# Patient Record
Sex: MALE | Race: BLACK OR AFRICAN AMERICAN
[De-identification: names, ages, dates, MRNs, and addresses within clinical notes are randomized per-mention and may not be internally consistent; named-entity substitution may affect disease eponyms.]

---

## 2019-01-11 ENCOUNTER — HOSPITAL ENCOUNTER (EMERGENCY)
Dept: HOSPITAL 72 - EMR | Age: 49
Discharge: HOME | End: 2019-01-11
Payer: COMMERCIAL

## 2019-01-11 VITALS — BODY MASS INDEX: 33.13 KG/M2 | WEIGHT: 250 LBS | HEIGHT: 73 IN

## 2019-01-11 VITALS — SYSTOLIC BLOOD PRESSURE: 112 MMHG | DIASTOLIC BLOOD PRESSURE: 70 MMHG

## 2019-01-11 DIAGNOSIS — I10: ICD-10-CM

## 2019-01-11 DIAGNOSIS — G89.29: ICD-10-CM

## 2019-01-11 DIAGNOSIS — Z86.73: ICD-10-CM

## 2019-01-11 DIAGNOSIS — M25.511: Primary | ICD-10-CM

## 2019-01-11 DIAGNOSIS — I25.10: ICD-10-CM

## 2019-01-11 PROCEDURE — 99282 EMERGENCY DEPT VISIT SF MDM: CPT

## 2019-01-11 NOTE — EMERGENCY ROOM REPORT
History of Present Illness


General


Chief Complaint:  Upper Extremity Injury


Source:  Family Member





Present Illness


HPI


48-year-old male patient presents the ER complaining of right shoulder and 

upper back pain.  Reports pain is been present for several years but has 

increased in the past few weeks.  Patient states that he works as a  

and believes the pain is related to that.  States that he was seen by his 

primary care doctor a week ago and had x-rays done, states that he has rotator 

cuff disease and arthritis.  Reports pain is been increasing since that time 

and radiating to the center of his middle upper back.  Reports has been taking 

ibuprofen without relief of symptoms.  Patient states that he was not able to 

get referral to orthopedic specialist to get an MRI done because it was denied 

by insurance.  Patient denies acute injury or trauma.  Requesting information 

about treatment and to relieve pain symptoms.  States has not seen physical 

therapy.  Denies fever, chest pain, shortness of breath.


Allergies:  


Coded Allergies:  


     No Known Allergies (Unverified , 5/23/14)





Patient History


Past Medical History:  see triage record


Reviewed Nursing Documentation:  PMH: Agreed; PSxH: Agreed





Nursing Documentation-PMH


Past Medical History:  No History, Except For


Hx Cardiac Problems:  Yes - CAD  TIA


Hx Hypertension:  Yes


Hx Cerebrovascular Accident:  Yes - TIA





Review of Systems


All Other Systems:  negative except mentioned in HPI





Physical Exam





Vital Signs








  Date Time  Temp Pulse Resp B/P (MAP) Pulse Ox O2 Delivery O2 Flow Rate FiO2


 


1/11/19 12:48 97.9 52 18 112/70 99 Room Air  








Sp02 EP Interpretation:  reviewed, normal


General Appearance:  well appearing, no apparent distress, alert, GCS 15, non-

toxic


Head:  normocephalic, atraumatic


Eyes:  bilateral eye normal inspection, bilateral eye PERRL


ENT:  hearing grossly normal, normal pharynx, no angioedema, normal voice, 

uvula midline, moist mucus membranes


Neck:  full range of motion


Respiratory:  lungs clear, normal breath sounds, no rhonchi, no respiratory 

distress, no accessory muscle use, no wheezing, speaking full sentences


Cardiovascular #1:  regular rate, rhythm, no edema


Cardiovascular #2:  2+ radial (R), 2+ radial (L)


Musculoskeletal:  back normal, digits/nails normal, gait/station normal, normal 

range of motion, non-tender, other - NVI, cap refill <2seconds, AIN/PIN/radial 

nerve, no wrist drop, no deformity, negative sulcus sign, no skin tenting


Neurologic:  alert, oriented x3, responsive, motor strength/tone normal, 

sensory intact


Psychiatric:  mood/affect normal


Skin:  no rash





Medical Decision Making


PA Attestation


Dr. Rosen is my supervising Physician whom patient management has been 

discussed with.


Diagnostic Impression:  


 Primary Impression:  


 Chronic pain in right shoulder


ER Course


Pt. presents to the ED c/o right shoulder and upper back pain.





Ddx considered but are not limited to fracture, sprain, strain, contusion, 

dislocation.


No erythema, no warmth to touch, no fever, nontoxic appearing, low suspicion 

for septic joint.


Soft compartments, no pulselessness, no pallor, no paresthesias, low suspicion 

for compartment syndrome at this time.





Vital signs: are WNL, pt. is afebrile





Ordered X-ray and pain medication.





ER COURSE


Provided with pain medication and lidocaine patch in the ER.


Advised patient not to take more than recommended dose of ibuprofen, may 

alternate taking Tylenol and ibuprofen every 4 hours.


Denies acute injury or trauma, full range of motion, does not require x-ray at 

this time.  Patient has pain with range of motion however has active range of 

motion.  Pain likely secondary to arthritis and perhaps muscular in nature.  

Will provide patient with Robaxin and lidocaine patch at discharge.


Advised on stretching exercises.





Patient instructed on RICE method: rest, ice, compression, elevation.


Patient instructed on rest, ice and heat.


Patient instructed to be WBAT





Work note provided.


Contact information for orthopedic urgent care provided, follow-up with urgent 

care if unable to followup with primary care provider and get referral to 

orthopedic specialist.


Followup with primary care provider. Discuss referral to ortho/pain management/

PT as needed. Discuss further imaging with MRI/CT as needed.





DISCHARGE:


-Rx provided for lidocaine patch


-Rx provided for Methocarbamol. SE drowsiness, do not drink, drive, or operate 

heavy machinery while using.





At this time pt. is stable for d/c to home. Patient is resting comfortably, in 

no acute distress, nontoxic appearing, talking without difficulty.


Will provide printed patient care instructions, and any necessary prescriptions.


Patient instructed to follow with primary care provider in 3 - 5 days and to 

request further follow-up as needed.


Care plan and follow up instructions have been discussed with the patient prior 

to discharge.


Take medications as directed. 


Patient questions asked and answered.


Patient reports understanding and agreement to treatment plan.


ER precautions given, patient instructed to return to ER immediately for any 

new or worsening of symptoms.





- Please note that this Emergency Department Report was dictated using [employer] technology software, occasionally this can lead to 

erroneous entry secondary to interpretation by the dictation equipment.





Last Vital Signs








  Date Time  Temp Pulse Resp B/P (MAP) Pulse Ox O2 Delivery O2 Flow Rate FiO2


 


1/11/19 12:48 97.9 52 18 112/70 99 Room Air  








Status:  improved


Disposition:  HOME, SELF-CARE


Condition:  Stable


Scripts


Methocarbamol* (ROBAXIN*) 500 Mg Tablet


500 MG PO TID, #21 TAB 0 Refills


   Prov: Jake Hernandez         1/11/19 


Lidocaine (Lidocaine) 1 Each Adh..patch


5 % TP DAILY, #7 PATCH


   Prov: Jake Hernandez         1/11/19


Patient Instructions:  Arthritis, Easy-to-Read, Back Exercises, Easy-to-Read, 

Shoulder Pain, Easy-to-Read, Shoulder Range of Motion Exercises





Additional Instructions:  


Patient instructed to follow up with primary care provider 3-5 and discuss 

further referral physical therapy/orthotic/pain management and MRI imaging at 

that time.


Patient instructed on rest, ice and heat.


Do not take muscle relaxant prior to drinking, driving, or operating heavy 

machinery.


Take medications as directed. 


Patient questions asked and answered.


ER precautions given, patient instructed to return to ER immediately for any 

new or worsening of symptoms.








[facility]


[id number]


[facility], [address]


[phone]


[email or web address]











Jake Hernandez Jan 11, 2019 13:09